# Patient Record
Sex: MALE | URBAN - METROPOLITAN AREA
[De-identification: names, ages, dates, MRNs, and addresses within clinical notes are randomized per-mention and may not be internally consistent; named-entity substitution may affect disease eponyms.]

---

## 2019-02-21 ENCOUNTER — APPOINTMENT (RX ONLY)
Dept: URBAN - METROPOLITAN AREA CLINIC 152 | Facility: CLINIC | Age: 39
Setting detail: DERMATOLOGY
End: 2019-02-21

## 2019-02-21 DIAGNOSIS — K64.4 RESIDUAL HEMORRHOIDAL SKIN TAGS: ICD-10-CM

## 2019-02-21 DIAGNOSIS — D22 MELANOCYTIC NEVI: ICD-10-CM

## 2019-02-21 DIAGNOSIS — L71.8 OTHER ROSACEA: ICD-10-CM

## 2019-02-21 PROBLEM — D22.71 MELANOCYTIC NEVI OF RIGHT LOWER LIMB, INCLUDING HIP: Status: ACTIVE | Noted: 2019-02-21

## 2019-02-21 PROBLEM — D22.39 MELANOCYTIC NEVI OF OTHER PARTS OF FACE: Status: ACTIVE | Noted: 2019-02-21

## 2019-02-21 PROCEDURE — ? PRESCRIPTION

## 2019-02-21 PROCEDURE — ? OBSERVATION AND MEASURE

## 2019-02-21 PROCEDURE — ? PRESCRIPTION MEDICATION MANAGEMENT

## 2019-02-21 PROCEDURE — ? COUNSELING

## 2019-02-21 PROCEDURE — 99214 OFFICE O/P EST MOD 30 MIN: CPT

## 2019-02-21 RX ORDER — IVERMECTIN 10 MG/G
CREAM TOPICAL QHS
Qty: 1 | Refills: 2 | Status: ERX | COMMUNITY
Start: 2019-02-21

## 2019-02-21 RX ORDER — HYDROCORTISONE 25 MG/G
OINTMENT TOPICAL BID
Qty: 1 | Refills: 1 | Status: ERX | COMMUNITY
Start: 2019-02-21

## 2019-02-21 RX ADMIN — HYDROCORTISONE: 25 OINTMENT TOPICAL at 21:36

## 2019-02-21 RX ADMIN — IVERMECTIN: 10 CREAM TOPICAL at 21:27

## 2019-02-21 ASSESSMENT — LOCATION DETAILED DESCRIPTION DERM
LOCATION DETAILED: LEFT SUPERIOR MEDIAL MALAR CHEEK
LOCATION DETAILED: LEFT CENTRAL MALAR CHEEK
LOCATION DETAILED: RIGHT MEDIAL BUTTOCK
LOCATION DETAILED: RIGHT MEDIAL PLANTAR MIDFOOT
LOCATION DETAILED: LEFT MEDIAL FOREHEAD
LOCATION DETAILED: RIGHT CENTRAL MALAR CHEEK

## 2019-02-21 ASSESSMENT — LOCATION SIMPLE DESCRIPTION DERM
LOCATION SIMPLE: RIGHT BUTTOCK
LOCATION SIMPLE: RIGHT PLANTAR SURFACE
LOCATION SIMPLE: LEFT CHEEK
LOCATION SIMPLE: LEFT FOREHEAD
LOCATION SIMPLE: RIGHT CHEEK

## 2019-02-21 ASSESSMENT — LOCATION ZONE DERM
LOCATION ZONE: TRUNK
LOCATION ZONE: FACE
LOCATION ZONE: FEET

## 2019-02-21 NOTE — PROCEDURE: PRESCRIPTION MEDICATION MANAGEMENT
Initiate Treatment: Soolantra 1% cream QHS
Detail Level: Zone
Render In Strict Bullet Format?: No
Initiate Treatment: hydrocortisone 2.5% ointment BID

## 2019-02-21 NOTE — PROCEDURE: COUNSELING
Detail Level: Zone
Detail Level: Simple
Patient Specific Counseling (Will Not Stick From Patient To Patient): - Recommended a colorectal surgeon

## 2019-08-22 ENCOUNTER — APPOINTMENT (RX ONLY)
Dept: URBAN - METROPOLITAN AREA CLINIC 38 | Facility: CLINIC | Age: 39
Setting detail: DERMATOLOGY
End: 2019-08-22

## 2019-08-22 DIAGNOSIS — L91.8 OTHER HYPERTROPHIC DISORDERS OF THE SKIN: ICD-10-CM

## 2019-08-22 DIAGNOSIS — L74.51 PRIMARY FOCAL HYPERHIDROSIS: ICD-10-CM

## 2019-08-22 DIAGNOSIS — D485 NEOPLASM OF UNCERTAIN BEHAVIOR OF SKIN: ICD-10-CM

## 2019-08-22 DIAGNOSIS — D22 MELANOCYTIC NEVI: ICD-10-CM

## 2019-08-22 PROBLEM — D22.5 MELANOCYTIC NEVI OF TRUNK: Status: ACTIVE | Noted: 2019-08-22

## 2019-08-22 PROBLEM — D48.5 NEOPLASM OF UNCERTAIN BEHAVIOR OF SKIN: Status: ACTIVE | Noted: 2019-08-22

## 2019-08-22 PROBLEM — L74.519 PRIMARY FOCAL HYPERHIDROSIS, UNSPECIFIED: Status: ACTIVE | Noted: 2019-08-22

## 2019-08-22 PROBLEM — L74.511 PRIMARY FOCAL HYPERHIDROSIS, FACE: Status: ACTIVE | Noted: 2019-08-22

## 2019-08-22 PROCEDURE — 99203 OFFICE O/P NEW LOW 30 MIN: CPT | Mod: 25

## 2019-08-22 PROCEDURE — ? PRESCRIPTION

## 2019-08-22 PROCEDURE — ? BIOPSY BY SHAVE METHOD

## 2019-08-22 PROCEDURE — ? COUNSELING

## 2019-08-22 PROCEDURE — 11102 TANGNTL BX SKIN SINGLE LES: CPT

## 2019-08-22 RX ORDER — GLYCOPYRROLATE 1 MG/1
TABLET ORAL
Qty: 90 | Refills: 1 | Status: ERX | COMMUNITY
Start: 2019-08-22

## 2019-08-22 RX ADMIN — GLYCOPYRROLATE: 1 TABLET ORAL at 14:10

## 2019-08-22 ASSESSMENT — LOCATION ZONE DERM
LOCATION ZONE: LEG
LOCATION ZONE: NECK
LOCATION ZONE: TRUNK
LOCATION ZONE: FACE

## 2019-08-22 ASSESSMENT — LOCATION SIMPLE DESCRIPTION DERM
LOCATION SIMPLE: UPPER BACK
LOCATION SIMPLE: LEFT ANTERIOR NECK
LOCATION SIMPLE: RIGHT UPPER BACK
LOCATION SIMPLE: LEFT CHEEK
LOCATION SIMPLE: LEFT PRETIBIAL REGION

## 2019-08-22 ASSESSMENT — LOCATION DETAILED DESCRIPTION DERM
LOCATION DETAILED: LEFT DISTAL PRETIBIAL REGION
LOCATION DETAILED: LEFT SUPERIOR ANTERIOR NECK
LOCATION DETAILED: RIGHT SUPERIOR UPPER BACK
LOCATION DETAILED: INFERIOR THORACIC SPINE
LOCATION DETAILED: LEFT INFERIOR CENTRAL MALAR CHEEK

## 2019-08-22 NOTE — PROCEDURE: COUNSELING
Detail Level: Zone
Detail Level: Generalized
Medical Necessity Information: LCD Guidelines vary from payer to payer. Please check with your payer's policy to determine medical necessity.
Medical Necessity Clause: Botulinum toxin hyperhidrosis therapy is medically necessary because

## 2019-08-22 NOTE — HPI: SKIN LESION (SKIN TAGS)
How Severe Are They?: mild
Is This A New Presentation, Or A Follow-Up?: Skin Lesions
Additional History: Would like them removed

## 2019-08-22 NOTE — PROCEDURE: BIOPSY BY SHAVE METHOD
Anesthesia Type: 1% lidocaine without epinephrine
Lab: [id number]
Lab Facility: [id number]
Biopsy Type: H and E
Was A Bandage Applied: Yes
Depth Of Biopsy: dermis
Dressing: bandage
Notification Instructions: Patient will be notified of biopsy results. However, patient instructed to call the office if not contacted within 2 weeks.
Biopsy Method: Acu-Razor
Hemostasis: Electrodesiccation
Electrodesiccation And Curettage Text: The wound bed was treated with electrodesiccation and curettage after the biopsy was performed.
Destruction After The Procedure: No
Wound Care: Vaseline
Path Notes (To The Dermatopathologist): .
Curettage Text: The wound bed was treated with curettage after the biopsy was done.
Cryotherapy Text: The wound bed was treated with cryotherapy after the biopsy was performed.
Size Of Lesion In Cm: 0
Type Of Destruction Used: Curettage
Consent: Verbal consent was obtained and risks were reviewed including but not limited to scarring, infection, bleeding, scabbing, incomplete removal, nerve damage and allergy to anesthesia. Also risk that hair will not grow through area.
Billing Type: Third-Party Bill
Electrodesiccation Text: The wound bed was treated with electrodesiccation after the biopsy was performed.
Detail Level: Detailed
Post-Care Instructions: Patient is to keep the biopsy site dry overnight, and then wash daily with mild soap and water, apply petroleum jelly, and cover with a bandaid once daily until healed, usually 1-2 weeks.
Silver Nitrate Text: The wound bed was treated with silver nitrate after the biopsy was performed.
Anesthesia Volume In Cc (Will Not Render If 0): 0.3

## 2019-10-07 ENCOUNTER — APPOINTMENT (RX ONLY)
Dept: URBAN - METROPOLITAN AREA CLINIC 38 | Facility: CLINIC | Age: 39
Setting detail: DERMATOLOGY
End: 2019-10-07

## 2019-10-07 PROBLEM — C44.729 SQUAMOUS CELL CARCINOMA OF SKIN OF LEFT LOWER LIMB, INCLUDING HIP: Status: ACTIVE | Noted: 2019-10-07

## 2019-10-07 PROCEDURE — 17313 MOHS 1 STAGE T/A/L: CPT

## 2019-10-07 PROCEDURE — ? COUNSELING

## 2019-10-07 PROCEDURE — ? MOHS SURGERY

## 2019-10-07 NOTE — PROCEDURE: MOHS SURGERY
Dolor de la pared torácica, cuidados ambulatorios   INFORMACIÓN GENERAL:   El dolor de la pared torácica  puede ser causado por problemas con los músculos, cartílago o huesos de la pared torácica  El dolor de la pared torácica también puede ser causado por dolor que se propaga a sanz pecho y que proviene de otra parte de sanz cuerpo  El dolor puede ser persistente, severo, leve o beata  Puede que venga y vaya o puede que sea [id number]  El dolor también puede ser peor cuando usted se mueve de ciertas formas, respira profundo o tose  Busque cuidados inmediatos para los siguientes síntomas:   · Dolor severo    · Usted tiene cualquiera de los siguientes signos de un ataque cardíaco:      ¨ Estornudos, presión, o dolor en sanz pecho que dura mas de 5 minutos o regresa  ¨ Incomodidad o dolor en sanz espalda, david, mandíbula, estómago, o brazos  ¨ Tiene dificultad para respirar  ¨ Náusea o vómitos  ¨ Se siente muy desvanecido o tiene sudores fríos especialmente en el pecho o dificultad para respirar  El tratamiento  depende de la causa de sanz dolor de la pared torácica  Es probable que usted necesite alguno de los siguientes:  · Los antiiflamatorios no esteroideos (AINEs) ayudan a reducir inflamación y dolor o fiebre  Ade medicamento esta disponible con o sin cara receta médica  Los AINEs podrían causar sangrado estomacal o problemas en los riñones en [employer]  Si usted esta tomando un anticoágulante, siempre  pregunte a sanz proveedor de janie si los AINEs son seguros para usted  Antes de ams AG, nyasia siempre la etiqueta de información y siga henrry indicaciones  · El acetaminofén  disminuye el dolor y está disponible sin receta médica  Pregunte cuánto moise y con cuánta frecuencia  [facility]  el acetaminofén puede causar daño al hígado si no se bal correctamente  · Aplique calor  a sanz pecho por 20 a 30 minutos cada 2 horas por tantos días rebecca le indiquen   El calor ayuda a disminuir el dolor y los espasmos musculares  · Aplique hielo  a stack pecho por 15 a 20 minutos cada hora según indicaciones  Use cara compresa de hielo o ponga hielo triturado en cara bolsa de plástico  Parker Wagner con cara toalla  El hielo ayuda a prevenir daños a los tejidos y disminuye la inflamación y el dolor  Programe cara gerry con stack proveedor de [employer] se le haya indicado: Anote henrry preguntas para que se acuerde de hacerlas boogie henrry visitas  ACUERDOS SOBRE STACK CUIDADO:   Usted tiene el derecho de participar en la planificación de stack cuidado  Aprenda todo lo que pueda sobre stack condición y rebecca darle tratamiento  Discuta con henrry médicos henrry opciones de tratamiento para juntos decidir el cuidado que usted quiere recibir  Usted siempre tiene el derecho a rechazar stack tratamiento  Esta información es sólo para uso en educación  Stack intención no es darle un consejo médico sobre enfermedades o tratamientos  Colsulte con stack Eryn Byrd farmacéutico antes de seguir cualquier régimen médico para saber si es seguro y efectivo para usted  © 2014 [address] is for End User's use only and may not be sold, redistributed or otherwise used for commercial purposes  All illustrations and images included in CareNotes® are the copyrighted property of [employer]  or Kory Ny  Suturegard Intro: Intraoperative tissue expansion was performed, utilizing the SUTUREGARD device, in order to reduce wound tension.

## 2019-10-07 NOTE — HPI: MOHS SURGERY CONSULTATION
Has The Cancer Been Biopsied Before?: has been previously biopsied
Body Location Override (Optional): Left distal pretibial region
When Was Your Biopsy?: 08/22/19
Who Is Your Referring Provider?: Allan Berry

## 2021-08-20 NOTE — PROCEDURE: MOHS SURGERY
Mohs Histo Method Verbiage: Each section was then chromacoded and processed in the Mohs lab using the Mohs protocol and submitted for frozen section. Epidermal Autograft Text: The defect edges were debeveled with a #15 scalpel blade.  Given the location of the defect, shape of the defect and the proximity to free margins an epidermal autograft was deemed most appropriate.  Using a sterile surgical marker, the primary defect shape was transferred to the donor site. The epidermal graft was then harvested.  The skin graft was then placed in the primary defect and oriented appropriately.

## 2024-05-06 ENCOUNTER — APPOINTMENT (RX ONLY)
Dept: URBAN - METROPOLITAN AREA CLINIC 152 | Facility: CLINIC | Age: 44
Setting detail: DERMATOLOGY
End: 2024-05-06

## 2024-05-06 DIAGNOSIS — D18.0 HEMANGIOMA: ICD-10-CM

## 2024-05-06 DIAGNOSIS — L82.0 INFLAMED SEBORRHEIC KERATOSIS: ICD-10-CM

## 2024-05-06 DIAGNOSIS — L85.3 XEROSIS CUTIS: ICD-10-CM

## 2024-05-06 PROBLEM — D23.5 OTHER BENIGN NEOPLASM OF SKIN OF TRUNK: Status: ACTIVE | Noted: 2024-05-06

## 2024-05-06 PROBLEM — D18.01 HEMANGIOMA OF SKIN AND SUBCUTANEOUS TISSUE: Status: ACTIVE | Noted: 2024-05-06

## 2024-05-06 PROCEDURE — 99204 OFFICE O/P NEW MOD 45 MIN: CPT | Mod: 25

## 2024-05-06 PROCEDURE — ? PRESCRIPTION MEDICATION MANAGEMENT

## 2024-05-06 PROCEDURE — ? BENIGN DESTRUCTION

## 2024-05-06 PROCEDURE — 17110 DESTRUCTION B9 LES UP TO 14: CPT

## 2024-05-06 PROCEDURE — ? COUNSELING

## 2024-05-06 ASSESSMENT — LOCATION ZONE DERM
LOCATION ZONE: NECK
LOCATION ZONE: FACE

## 2024-05-06 ASSESSMENT — LOCATION DETAILED DESCRIPTION DERM
LOCATION DETAILED: LEFT SUPERIOR ANTERIOR NECK
LOCATION DETAILED: RIGHT CLAVICULAR NECK
LOCATION DETAILED: RIGHT INFERIOR LATERAL NECK
LOCATION DETAILED: LEFT INFERIOR LATERAL NECK
LOCATION DETAILED: RIGHT MEDIAL FOREHEAD
LOCATION DETAILED: SUPERIOR MID FOREHEAD
LOCATION DETAILED: LEFT CLAVICULAR NECK
LOCATION DETAILED: RIGHT INFERIOR ANTERIOR NECK

## 2024-05-06 ASSESSMENT — LOCATION SIMPLE DESCRIPTION DERM
LOCATION SIMPLE: RIGHT ANTERIOR NECK
LOCATION SIMPLE: SUPERIOR FOREHEAD
LOCATION SIMPLE: LEFT ANTERIOR NECK
LOCATION SIMPLE: RIGHT FOREHEAD

## 2024-05-06 NOTE — PROCEDURE: BENIGN DESTRUCTION
Consent: The patient's consent was obtained including but not limited to risks of crusting, scabbing, blistering, scarring, darker or lighter pigmentary change, recurrence, incomplete removal and infection.
Medical Necessity Information: It is in your best interest to select a reason for this procedure from the list below. All of these items fulfill various CMS LCD requirements except the new and changing color options.
Medical Necessity Clause: This procedure was medically necessary because the lesions that were treated were:
Render Note In Bullet Format When Appropriate: No
Post-Care Instructions: I reviewed with the patient in detail post-care instructions. Patient is to wear sunprotection, and avoid picking at any of the treated lesions. Pt may apply Vaseline to crusted or scabbing areas.
Treatment Number (Will Not Render If 0): 0
Anesthesia Volume In Cc: 0.5
Detail Level: Detailed